# Patient Record
Sex: FEMALE | Race: WHITE | NOT HISPANIC OR LATINO | Employment: OTHER | URBAN - METROPOLITAN AREA
[De-identification: names, ages, dates, MRNs, and addresses within clinical notes are randomized per-mention and may not be internally consistent; named-entity substitution may affect disease eponyms.]

---

## 2017-10-19 ENCOUNTER — HOSPITAL ENCOUNTER (OUTPATIENT)
Dept: MAMMOGRAPHY | Facility: HOSPITAL | Age: 67
Discharge: HOME | End: 2017-10-19

## 2017-10-19 DIAGNOSIS — Z12.31 VISIT FOR SCREENING MAMMOGRAM: ICD-10-CM

## 2018-10-22 ENCOUNTER — HOSPITAL ENCOUNTER (OUTPATIENT)
Dept: MAMMOGRAPHY | Facility: CLINIC | Age: 68
Discharge: HOME | End: 2018-10-22

## 2018-10-22 DIAGNOSIS — Z12.31 VISIT FOR SCREENING MAMMOGRAM: ICD-10-CM

## 2019-11-07 ENCOUNTER — HOSPITAL ENCOUNTER (OUTPATIENT)
Dept: MAMMOGRAPHY | Facility: CLINIC | Age: 69
Discharge: HOME | End: 2019-11-07

## 2019-11-07 DIAGNOSIS — Z12.31 VISIT FOR SCREENING MAMMOGRAM: ICD-10-CM

## 2019-12-17 ENCOUNTER — AMBULATORY - HEALTHEAST (OUTPATIENT)
Dept: SCHEDULING | Facility: CLINIC | Age: 69
End: 2019-12-17

## 2019-12-17 DIAGNOSIS — I99.9 VASCULAR DISEASE: ICD-10-CM

## 2020-11-12 ENCOUNTER — RECORDS - HEALTHEAST (OUTPATIENT)
Dept: ADMINISTRATIVE | Facility: OTHER | Age: 70
End: 2020-11-12

## 2021-01-22 ENCOUNTER — HOSPITAL ENCOUNTER (OUTPATIENT)
Dept: MAMMOGRAPHY | Facility: CLINIC | Age: 71
Discharge: HOME | End: 2021-01-22

## 2021-01-22 DIAGNOSIS — Z12.31 VISIT FOR SCREENING MAMMOGRAM: ICD-10-CM

## 2021-05-27 ENCOUNTER — RECORDS - HEALTHEAST (OUTPATIENT)
Dept: ADMINISTRATIVE | Facility: CLINIC | Age: 71
End: 2021-05-27

## 2021-07-13 ENCOUNTER — RECORDS - HEALTHEAST (OUTPATIENT)
Dept: ADMINISTRATIVE | Facility: CLINIC | Age: 71
End: 2021-07-13

## 2021-07-21 ENCOUNTER — RECORDS - HEALTHEAST (OUTPATIENT)
Dept: ADMINISTRATIVE | Facility: CLINIC | Age: 71
End: 2021-07-21

## 2021-07-22 ENCOUNTER — RECORDS - HEALTHEAST (OUTPATIENT)
Dept: FAMILY MEDICINE | Facility: CLINIC | Age: 71
End: 2021-07-22

## 2021-07-22 DIAGNOSIS — Z12.31 OTHER SCREENING MAMMOGRAM: ICD-10-CM

## 2022-07-01 ENCOUNTER — HOSPITAL ENCOUNTER (EMERGENCY)
Facility: CLINIC | Age: 72
Discharge: HOME | End: 2022-07-01
Attending: EMERGENCY MEDICINE | Admitting: EMERGENCY MEDICINE
Payer: COMMERCIAL

## 2022-07-01 VITALS
HEART RATE: 83 BPM | WEIGHT: 220 LBS | RESPIRATION RATE: 19 BRPM | OXYGEN SATURATION: 98 % | TEMPERATURE: 96.8 F | BODY MASS INDEX: 38.97 KG/M2 | SYSTOLIC BLOOD PRESSURE: 170 MMHG | DIASTOLIC BLOOD PRESSURE: 87 MMHG

## 2022-07-01 DIAGNOSIS — K57.32 SIGMOID DIVERTICULITIS: ICD-10-CM

## 2022-07-01 PROBLEM — N39.3 URINARY, INCONTINENCE, STRESS FEMALE: Status: ACTIVE | Noted: 2017-03-03

## 2022-07-01 PROBLEM — N18.31 STAGE 3A CHRONIC KIDNEY DISEASE (H): Status: ACTIVE | Noted: 2020-12-24

## 2022-07-01 PROBLEM — M54.16 LUMBAR BACK PAIN WITH RADICULOPATHY AFFECTING LOWER EXTREMITY: Status: ACTIVE | Noted: 2020-12-09

## 2022-07-01 PROBLEM — D22.9 ATYPICAL NEVUS: Status: ACTIVE | Noted: 2021-04-02

## 2022-07-01 PROBLEM — N76.0 VULVOVAGINITIS: Status: ACTIVE | Noted: 2022-07-01

## 2022-07-01 PROBLEM — B02.9 HERPES ZOSTER WITHOUT COMPLICATION: Status: ACTIVE | Noted: 2020-07-29

## 2022-07-01 PROBLEM — D49.2 SKIN NEOPLASM: Status: ACTIVE | Noted: 2022-07-01

## 2022-07-01 PROBLEM — L65.9 HAIR LOSS: Status: ACTIVE | Noted: 2017-03-06

## 2022-07-01 PROBLEM — R32 URINARY INCONTINENCE: Status: ACTIVE | Noted: 2022-07-01

## 2022-07-01 LAB
ALBUMIN SERPL-MCNC: 3.9 G/DL (ref 3.5–5)
ALP SERPL-CCNC: 80 U/L (ref 45–120)
ALT SERPL W P-5'-P-CCNC: 25 U/L (ref 0–45)
ANION GAP SERPL CALCULATED.3IONS-SCNC: 10 MMOL/L (ref 5–18)
AST SERPL W P-5'-P-CCNC: 34 U/L (ref 0–40)
BASOPHILS # BLD AUTO: 0 10E3/UL (ref 0–0.2)
BASOPHILS NFR BLD AUTO: 0 %
BILIRUB DIRECT SERPL-MCNC: 0.2 MG/DL
BILIRUB SERPL-MCNC: 0.5 MG/DL (ref 0–1)
BUN SERPL-MCNC: 10 MG/DL (ref 8–28)
C REACTIVE PROTEIN LHE: 3 MG/DL (ref 0–?)
CALCIUM SERPL-MCNC: 9.7 MG/DL (ref 8.5–10.5)
CHLORIDE BLD-SCNC: 91 MMOL/L (ref 98–107)
CO2 SERPL-SCNC: 24 MMOL/L (ref 22–31)
CREAT SERPL-MCNC: 0.9 MG/DL (ref 0.6–1.1)
EOSINOPHIL # BLD AUTO: 0.1 10E3/UL (ref 0–0.7)
EOSINOPHIL NFR BLD AUTO: 1 %
ERYTHROCYTE [DISTWIDTH] IN BLOOD BY AUTOMATED COUNT: 13.4 % (ref 10–15)
GFR SERPL CREATININE-BSD FRML MDRD: 68 ML/MIN/1.73M2
GLUCOSE BLD-MCNC: 137 MG/DL (ref 70–125)
HCT VFR BLD AUTO: 34.1 % (ref 35–47)
HGB BLD-MCNC: 11.4 G/DL (ref 11.7–15.7)
IMM GRANULOCYTES # BLD: 0.1 10E3/UL
IMM GRANULOCYTES NFR BLD: 0 %
LIPASE SERPL-CCNC: 26 U/L (ref 0–52)
LYMPHOCYTES # BLD AUTO: 1.7 10E3/UL (ref 0.8–5.3)
LYMPHOCYTES NFR BLD AUTO: 9 %
MAGNESIUM SERPL-MCNC: 1.9 MG/DL (ref 1.8–2.6)
MCH RBC QN AUTO: 28.6 PG (ref 26.5–33)
MCHC RBC AUTO-ENTMCNC: 33.4 G/DL (ref 31.5–36.5)
MCV RBC AUTO: 86 FL (ref 78–100)
MONOCYTES # BLD AUTO: 0.7 10E3/UL (ref 0–1.3)
MONOCYTES NFR BLD AUTO: 4 %
NEUTROPHILS # BLD AUTO: 15.4 10E3/UL (ref 1.6–8.3)
NEUTROPHILS NFR BLD AUTO: 86 %
NRBC # BLD AUTO: 0 10E3/UL
NRBC BLD AUTO-RTO: 0 /100
PLATELET # BLD AUTO: 348 10E3/UL (ref 150–450)
POTASSIUM BLD-SCNC: 3.8 MMOL/L (ref 3.5–5)
PROT SERPL-MCNC: 7.9 G/DL (ref 6–8)
RBC # BLD AUTO: 3.99 10E6/UL (ref 3.8–5.2)
SODIUM SERPL-SCNC: 125 MMOL/L (ref 136–145)
WBC # BLD AUTO: 18 10E3/UL (ref 4–11)

## 2022-07-01 PROCEDURE — 83735 ASSAY OF MAGNESIUM: CPT | Performed by: EMERGENCY MEDICINE

## 2022-07-01 PROCEDURE — 250N000011 HC RX IP 250 OP 636: Performed by: EMERGENCY MEDICINE

## 2022-07-01 PROCEDURE — 82248 BILIRUBIN DIRECT: CPT | Performed by: EMERGENCY MEDICINE

## 2022-07-01 PROCEDURE — 96361 HYDRATE IV INFUSION ADD-ON: CPT

## 2022-07-01 PROCEDURE — 96374 THER/PROPH/DIAG INJ IV PUSH: CPT

## 2022-07-01 PROCEDURE — 83690 ASSAY OF LIPASE: CPT | Performed by: EMERGENCY MEDICINE

## 2022-07-01 PROCEDURE — 99284 EMERGENCY DEPT VISIT MOD MDM: CPT | Mod: 25

## 2022-07-01 PROCEDURE — 82310 ASSAY OF CALCIUM: CPT | Performed by: EMERGENCY MEDICINE

## 2022-07-01 PROCEDURE — 85004 AUTOMATED DIFF WBC COUNT: CPT | Performed by: EMERGENCY MEDICINE

## 2022-07-01 PROCEDURE — 86140 C-REACTIVE PROTEIN: CPT | Performed by: EMERGENCY MEDICINE

## 2022-07-01 PROCEDURE — 36415 COLL VENOUS BLD VENIPUNCTURE: CPT | Performed by: EMERGENCY MEDICINE

## 2022-07-01 PROCEDURE — 258N000003 HC RX IP 258 OP 636: Performed by: EMERGENCY MEDICINE

## 2022-07-01 RX ORDER — ONDANSETRON 4 MG/1
4 TABLET, ORALLY DISINTEGRATING ORAL EVERY 8 HOURS PRN
Qty: 20 TABLET | Refills: 0 | Status: SHIPPED | OUTPATIENT
Start: 2022-07-01 | End: 2024-04-16

## 2022-07-01 RX ORDER — DICYCLOMINE HCL 20 MG
20 TABLET ORAL 4 TIMES DAILY PRN
Qty: 20 TABLET | Refills: 0 | Status: SHIPPED | OUTPATIENT
Start: 2022-07-01 | End: 2024-04-16

## 2022-07-01 RX ORDER — ONDANSETRON 2 MG/ML
4 INJECTION INTRAMUSCULAR; INTRAVENOUS ONCE
Status: COMPLETED | OUTPATIENT
Start: 2022-07-01 | End: 2022-07-01

## 2022-07-01 RX ADMIN — ONDANSETRON 4 MG: 2 INJECTION INTRAMUSCULAR; INTRAVENOUS at 14:58

## 2022-07-01 RX ADMIN — SODIUM CHLORIDE, POTASSIUM CHLORIDE, SODIUM LACTATE AND CALCIUM CHLORIDE 1000 ML: 600; 310; 30; 20 INJECTION, SOLUTION INTRAVENOUS at 14:58

## 2022-07-01 ASSESSMENT — ENCOUNTER SYMPTOMS
APPETITE CHANGE: 1
ABDOMINAL PAIN: 1
DIARRHEA: 1
NAUSEA: 1

## 2022-07-01 NOTE — DISCHARGE INSTRUCTIONS
Take the antibiotic (Augmentin) for your diverticulitis. Stop your previously-prescribed antibiotics.    Use the Zofran for any further nausea.    As needed for pain control at home, take:  - over-the-counter ibuprofen 800mg by mouth every 8 hours (max dose 2400mg in 24 hours)  - over-the-counter acetaminophen 1g by mouth every 6 hours (max dose 4g in 24 hours)  - prescribed Bentyl for abdominal pain (this helps with intestinal spasming pain)    Follow up with your Primary Care provider in 2 days for a recheck.    Return to the Emergency Department for any persistent vomiting, severe worsening, or any other concerns.

## 2022-07-01 NOTE — ED TRIAGE NOTES
Pt dx with diverticulitis 2 days ago. Started abx same day. Last night had severe diarrhea and pain. Today pain isn't has bad. Worried about the medications taking.      Triage Assessment     Row Name 07/01/22 1229       Triage Assessment (Adult)    Airway WDL WDL       Respiratory WDL    Respiratory WDL WDL       Skin Circulation/Temperature WDL    Skin Circulation/Temperature WDL WDL       Cardiac WDL    Cardiac WDL WDL       Peripheral/Neurovascular WDL    Peripheral Neurovascular WDL WDL       Cognitive/Neuro/Behavioral WDL    Cognitive/Neuro/Behavioral WDL WDL

## 2022-08-29 DIAGNOSIS — I10 ESSENTIAL HYPERTENSION: ICD-10-CM

## 2022-08-29 RX ORDER — LISINOPRIL AND HYDROCHLOROTHIAZIDE 20; 25 MG/1; MG/1
2 TABLET ORAL DAILY
Qty: 180 TABLET | Refills: 4 | OUTPATIENT
Start: 2022-08-29

## 2022-12-09 ENCOUNTER — HOSPITAL ENCOUNTER (OUTPATIENT)
Dept: BONE DENSITY | Facility: HOSPITAL | Age: 72
Discharge: HOME | End: 2022-12-09
Attending: INTERNAL MEDICINE
Payer: COMMERCIAL

## 2022-12-09 ENCOUNTER — ANCILLARY PROCEDURE (OUTPATIENT)
Dept: MAMMOGRAPHY | Facility: HOSPITAL | Age: 72
End: 2022-12-09
Attending: INTERNAL MEDICINE
Payer: COMMERCIAL

## 2022-12-09 DIAGNOSIS — Z78.0 POST-MENOPAUSAL: ICD-10-CM

## 2022-12-09 DIAGNOSIS — Z12.31 VISIT FOR SCREENING MAMMOGRAM: ICD-10-CM

## 2022-12-09 PROCEDURE — 77080 DXA BONE DENSITY AXIAL: CPT

## 2022-12-09 PROCEDURE — 77067 SCR MAMMO BI INCL CAD: CPT

## 2023-10-17 ENCOUNTER — TRANSFERRED RECORDS (OUTPATIENT)
Dept: HEALTH INFORMATION MANAGEMENT | Facility: CLINIC | Age: 73
End: 2023-10-17

## 2023-11-20 ENCOUNTER — MEDICAL CORRESPONDENCE (OUTPATIENT)
Dept: HEALTH INFORMATION MANAGEMENT | Facility: CLINIC | Age: 73
End: 2023-11-20
Payer: COMMERCIAL

## 2023-12-12 ENCOUNTER — ANCILLARY PROCEDURE (OUTPATIENT)
Dept: MAMMOGRAPHY | Facility: HOSPITAL | Age: 73
End: 2023-12-12
Attending: INTERNAL MEDICINE
Payer: COMMERCIAL

## 2023-12-12 DIAGNOSIS — Z12.31 VISIT FOR SCREENING MAMMOGRAM: ICD-10-CM

## 2023-12-12 PROCEDURE — 77067 SCR MAMMO BI INCL CAD: CPT

## 2024-03-20 ENCOUNTER — TRANSCRIBE ORDERS (OUTPATIENT)
Dept: NEUROSURGERY | Facility: CLINIC | Age: 74
End: 2024-03-20
Payer: COMMERCIAL

## 2024-03-20 DIAGNOSIS — G95.0 SYRINX OF SPINAL CORD (H): Primary | ICD-10-CM

## 2024-04-01 ENCOUNTER — TELEPHONE (OUTPATIENT)
Dept: NEUROSURGERY | Facility: CLINIC | Age: 74
End: 2024-04-01
Payer: COMMERCIAL

## 2024-04-01 NOTE — TELEPHONE ENCOUNTER
Action    Action Taken Some notes received from [place], sent fax to see if patient has any other recent notes from 11/20/2023.      SPINE PATIENTS - NEW PROTOCOL PREVISIT    RECORDS RECEIVED FROM: referred by , Barney Hanley PA-C to see Dr. Victoria   REASON FOR VISIT: Syrinx of spinal cord (H)   PROVIDER: Julien   DATE OF APPT: 04/16/2024   NOTES (FOR ALL VISITS) STATUS DETAILS   OFFICE NOTE from referring provider Received Referral received and scanned into chart   OFFICE NOTE from other specialist N/A    DISCHARGE SUMMARY from hospital N/A    DISCHARGE REPORT from ER N/A    OPERATIVE REPORT N/A    EMG REPORT N/A    MEDICATION LIST N/A    IMAGING  (FOR ALL VISITS)     MRI (HEAD, NECK, SPINE) Received MRI Thoracic 11/17/2023, in chart  MRI Lumbar 10/17/2023, in chart   XRAY (SPINE) *NEUROSURGERY* N/A    CT (HEAD, NECK, SPINE) N/A

## 2024-04-01 NOTE — TELEPHONE ENCOUNTER
----- Message from Clari Troy RN sent at 4/1/2024  3:05 PM CDT -----  Regarding: RE: Upcoming appt  Okay to keep as scheduled. No updated imaging needed  ----- Message -----  From: Ovidio Hitchcock  Sent: 4/1/2024   2:18 PM CDT  To: Edgar Victoria MD; #  Subject: RE: Upcoming appt                                Patient referred to us for Syrinx of spinal cord (H). Last imaging completed MRI Thoracic 11/17/2023, in chart and MRI Lumbar 10/17/2023, in chart. Does patient need any recent imaging prior to visit? Or ok to keep appt as scheduled?

## 2024-04-16 ENCOUNTER — OFFICE VISIT (OUTPATIENT)
Dept: NEUROSURGERY | Facility: CLINIC | Age: 74
End: 2024-04-16
Attending: PHYSICIAN ASSISTANT
Payer: COMMERCIAL

## 2024-04-16 ENCOUNTER — PRE VISIT (OUTPATIENT)
Dept: NEUROSURGERY | Facility: CLINIC | Age: 74
End: 2024-04-16
Payer: COMMERCIAL

## 2024-04-16 VITALS
HEIGHT: 63 IN | SYSTOLIC BLOOD PRESSURE: 134 MMHG | OXYGEN SATURATION: 97 % | DIASTOLIC BLOOD PRESSURE: 84 MMHG | WEIGHT: 230 LBS | HEART RATE: 75 BPM | BODY MASS INDEX: 40.75 KG/M2

## 2024-04-16 DIAGNOSIS — G95.0 SYRINX OF SPINAL CORD (H): ICD-10-CM

## 2024-04-16 DIAGNOSIS — M54.16 SPINAL STENOSIS OF LUMBAR REGION WITH RADICULOPATHY: ICD-10-CM

## 2024-04-16 DIAGNOSIS — M43.10 DEGENERATIVE SPONDYLOLISTHESIS: Primary | ICD-10-CM

## 2024-04-16 DIAGNOSIS — M48.061 SPINAL STENOSIS OF LUMBAR REGION WITH RADICULOPATHY: ICD-10-CM

## 2024-04-16 PROCEDURE — G0463 HOSPITAL OUTPT CLINIC VISIT: HCPCS | Performed by: NEUROLOGICAL SURGERY

## 2024-04-16 PROCEDURE — G2211 COMPLEX E/M VISIT ADD ON: HCPCS | Performed by: NEUROLOGICAL SURGERY

## 2024-04-16 PROCEDURE — 99204 OFFICE O/P NEW MOD 45 MIN: CPT | Performed by: NEUROLOGICAL SURGERY

## 2024-04-16 RX ORDER — CARTEOLOL HYDROCHLORIDE 10 MG/ML
1 SOLUTION OPHTHALMIC 2 TIMES DAILY
COMMUNITY

## 2024-04-16 RX ORDER — BIMATOPROST 0.1 MG/ML
SOLUTION/ DROPS OPHTHALMIC
COMMUNITY
Start: 2023-04-14

## 2024-04-16 RX ORDER — AMLODIPINE BESYLATE 5 MG/1
TABLET ORAL
COMMUNITY

## 2024-04-16 RX ORDER — MESALAMINE 1.2 G/1
TABLET, DELAYED RELEASE ORAL
COMMUNITY
Start: 2024-04-15

## 2024-04-16 RX ORDER — ROSUVASTATIN CALCIUM 5 MG/1
5 TABLET, COATED ORAL AT BEDTIME
COMMUNITY

## 2024-04-16 ASSESSMENT — PAIN SCALES - GENERAL: PAINLEVEL: MODERATE PAIN (4)

## 2024-04-16 NOTE — NURSING NOTE
"Danni Cruz is a 73 year old female who presents for:  Chief Complaint   Patient presents with    Consult        Vitals:    Vitals:    04/16/24 1247   BP: 134/84   Pulse: 75   SpO2: 97%   Weight: 230 lb (104.3 kg)   Height: 5' 3\" (1.6 m)       BMI:  Estimated body mass index is 40.74 kg/m  as calculated from the following:    Height as of this encounter: 5' 3\" (1.6 m).    Weight as of this encounter: 230 lb (104.3 kg).    Pain Score:  Moderate Pain (4)        Amendo Phorn      "

## 2024-04-16 NOTE — LETTER
4/16/2024         RE: Danni Cruz  [address] [facility] [id number]        Dear Colleague,    Thank you for referring your patient, Danni Cruz, to the [facility] [place]. Please see a copy of my visit note below.    It was a pleasure to see Danni Cruz today in Neurosurgery Clinic. She is a 73 year old female who is here for her thoracic syrinx.    She has significant symptoms from lumbar stenosis and has been seen at [facility].  She has done physical therapy and epidural injections with some temporary relief.    No past medical history on file.  No past surgical history on file.     Allergies   Allergen Reactions     Cefuroxime Diarrhea     Metronidazole Diarrhea     Procaine Other (See Comments)     Other reaction(s): Tachycardia    Tolerates carbocaine       Current Outpatient Medications:      co-enzyme Q-10 30 mg capsule, [CO-ENZYME Q-10 30 MG CAPSULE] Take 30 mg by mouth 3 (three) times a day., Disp: , Rfl:      levothyroxine (SYNTHROID, LEVOTHROID) 50 MCG tablet, [LEVOTHYROXINE (SYNTHROID, LEVOTHROID) 50 MCG TABLET] Take 1 tablet (50 mcg total) by mouth Daily at 6:00 am., Disp: 90 tablet, Rfl: 4     lisinopril-hydrochlorothiazide (PRINZIDE,ZESTORETIC) 20-25 mg per tablet, [LISINOPRIL-HYDROCHLOROTHIAZIDE (PRINZIDE,ZESTORETIC) 20-25 MG PER TABLET] Take 2 tablets by mouth daily., Disp: 180 tablet, Rfl: 4     LUMIGAN 0.01 % SOLN ophthalmic solution, , Disp: , Rfl:      mesalamine (LIALDA) 1.2 g DR tablet, , Disp: , Rfl:      nortriptyline (PAMELOR) 25 MG capsule, [NORTRIPTYLINE (PAMELOR) 25 MG CAPSULE] Take 1 capsule (25 mg total) by mouth bedtime., Disp: 90 capsule, Rfl: 4     OMEGA-3/DHA/EPA/FISH OIL (FISH OIL-OMEGA-3 FATTY ACIDS) 300-1,000 mg capsule, [OMEGA-3/DHA/EPA/FISH OIL (FISH OIL-OMEGA-3 FATTY ACIDS) 300-1,000 MG CAPSULE] Take 2 g by mouth daily., Disp: , Rfl:      ALPRAZolam (XANAX) 0.25 MG tablet, [ALPRAZOLAM (XANAX) 0.25 MG TABLET]  Take 1 tab 30 min prior to flight, repeat x 1 as needed, Disp: 6 tablet, Rfl: 1     amLODIPine (NORVASC) 5 MG tablet, , Disp: , Rfl:      carteolol (OCUPRESS) 1 % ophthalmic solution, Place 1 drop into both eyes 2 times daily, Disp: , Rfl:      rosuvastatin (CRESTOR) 5 MG tablet, Take 5 mg by mouth at bedtime, Disp: , Rfl:   Social History     Socioeconomic History     Marital status:      Spouse name: None     Number of children: None     Years of education: None     Highest education level: None   Tobacco Use     Smoking status: Never     Smokeless tobacco: Never     Social Determinants of Health     Financial Resource Strain: Low Risk  (8/28/2023)    [facility], [facility]    Financial Resource Strain      Difficulty of Paying Living Expenses: 3   Food Insecurity: No Food Insecurity (8/28/2023)    Received from [facility], [facility]    Food Insecurity      Worried About Running Out of Food in the Last Year: 1   Transportation Needs: No Transportation Needs (8/28/2023)    Received from [facility], [employer] [facility]    Transportation Needs      Lack of Transportation (Medical): 1    Received from [facility], [facility]    Social Connections   Housing Stability: Low Risk  (8/28/2023)    Received from [facility], [employer] [facility] [facility]    Housing Stability      Unable to Pay for Housing in the Last Year: 1      Problem (# of Occurrences) Relation (Name,Age of Onset)    Breast Cancer (1) Cousin: 40s - paternal    Kidney Cancer (1) Father    Lung Cancer (1) Mother             ROS: 10 point ROS neg other than the symptoms noted above in the HPI.    Vitals:    04/16/24 1247   BP:  "134/84   Pulse: 75   SpO2: 97%   Weight: 104.3 kg (230 lb)   Height: 1.6 m (5' 3\")     Body mass index is 40.74 kg/m .  Moderate Pain (4)    Awake alert and oriented  Bilateral upper and lower extremity strength is 5 out of 5 in all muscle groups reflexes symmetric and normal.      Imaging: Review of thoracic and lumbar MRIs shows severe lumbar stenosis particularly at L4-5 which I think is concordant with her current symptomatology.  There does appear to be what is likely a persistent central canal in the thoracic spine that tends to skip levels.  The imaging was reviewed with patient shown to the patient in clinic today.    Assessment: Lumbar stenosis and spondylolisthesis with radiculopathy.  Thoracic persistent central canal.    Plan: I do not think that the imaging findings in the thoracic spine are concerning and should not keep her from having surgery on her lumbar spine.  I do not think that further follow-up is necessary at this time.         Again, thank you for allowing me to participate in the care of your patient.        Sincerely,        Edgar Victoria MD  "

## 2024-04-16 NOTE — PROGRESS NOTES
It was a pleasure to see Danni Cruz today in Neurosurgery Clinic. She is a 73 year old female who is here for her thoracic syrinx.    She has significant symptoms from lumbar stenosis and has been seen at [facility].  She has done physical therapy and epidural injections with some temporary relief.    No past medical history on file.  No past surgical history on file.     Allergies   Allergen Reactions    Cefuroxime Diarrhea    Metronidazole Diarrhea    Procaine Other (See Comments)     Other reaction(s): Tachycardia    Tolerates carbocaine       Current Outpatient Medications:     co-enzyme Q-10 30 mg capsule, [CO-ENZYME Q-10 30 MG CAPSULE] Take 30 mg by mouth 3 (three) times a day., Disp: , Rfl:     levothyroxine (SYNTHROID, LEVOTHROID) 50 MCG tablet, [LEVOTHYROXINE (SYNTHROID, LEVOTHROID) 50 MCG TABLET] Take 1 tablet (50 mcg total) by mouth Daily at 6:00 am., Disp: 90 tablet, Rfl: 4    lisinopril-hydrochlorothiazide (PRINZIDE,ZESTORETIC) 20-25 mg per tablet, [LISINOPRIL-HYDROCHLOROTHIAZIDE (PRINZIDE,ZESTORETIC) 20-25 MG PER TABLET] Take 2 tablets by mouth daily., Disp: 180 tablet, Rfl: 4    LUMIGAN 0.01 % SOLN ophthalmic solution, , Disp: , Rfl:     mesalamine (LIALDA) 1.2 g DR tablet, , Disp: , Rfl:     nortriptyline (PAMELOR) 25 MG capsule, [NORTRIPTYLINE (PAMELOR) 25 MG CAPSULE] Take 1 capsule (25 mg total) by mouth bedtime., Disp: 90 capsule, Rfl: 4    OMEGA-3/DHA/EPA/FISH OIL (FISH OIL-OMEGA-3 FATTY ACIDS) 300-1,000 mg capsule, [OMEGA-3/DHA/EPA/FISH OIL (FISH OIL-OMEGA-3 FATTY ACIDS) 300-1,000 MG CAPSULE] Take 2 g by mouth daily., Disp: , Rfl:     ALPRAZolam (XANAX) 0.25 MG tablet, [ALPRAZOLAM (XANAX) 0.25 MG TABLET] Take 1 tab 30 min prior to flight, repeat x 1 as needed, Disp: 6 tablet, Rfl: 1    amLODIPine (NORVASC) 5 MG tablet, , Disp: , Rfl:     carteolol (OCUPRESS) 1 % ophthalmic solution, Place 1 drop into both eyes 2 times daily, Disp: , Rfl:     rosuvastatin (CRESTOR) 5 MG tablet,  "Take 5 mg by mouth at bedtime, Disp: , Rfl:   Social History     Socioeconomic History    Marital status:      Spouse name: None    Number of children: None    Years of education: None    Highest education level: None   Tobacco Use    Smoking status: Never    Smokeless tobacco: Never     Social Determinants of Health     Financial Resource Strain: Low Risk  (8/28/2023)    [facility], [facility]    Financial Resource Strain     Difficulty of Paying Living Expenses: 3   Food Insecurity: No Food Insecurity (8/28/2023)    Received from [facility], [facility]    Food Insecurity     Worried About Running Out of Food in the Last Year: 1   Transportation Needs: No Transportation Needs (8/28/2023)    Received from [facility], [employer] [facility] [employer] [facility]    Transportation Needs     Lack of Transportation (Medical): 1    Received from [facility], [facility]    Social Connections   Housing Stability: Low Risk  (8/28/2023)    Received [facility], [facility]    Housing Stability     Unable to Pay for Housing in the Last Year: 1      Problem (# of Occurrences) Relation (Name,Age of Onset)    Breast Cancer (1) Cousin: 40s - paternal    Kidney Cancer (1) Father    Lung Cancer (1) Mother             ROS: 10 point ROS neg other than the symptoms noted above in the HPI.    Vitals:    04/16/24 1247   BP: 134/84   Pulse: 75   SpO2: 97%   Weight: 104.3 kg (230 lb)   Height: 1.6 m (5' 3\")     Body mass index is 40.74 kg/m .  Moderate Pain (4)    Awake alert and oriented  Bilateral upper and lower extremity strength is 5 out of 5 in all muscle groups reflexes symmetric and normal.      Imaging: " Review of thoracic and lumbar MRIs shows severe lumbar stenosis particularly at L4-5 which I think is concordant with her current symptomatology.  There does appear to be what is likely a persistent central canal in the thoracic spine that tends to skip levels.  The imaging was reviewed with patient shown to the patient in clinic today.    Assessment: Lumbar stenosis and spondylolisthesis with radiculopathy.  Thoracic persistent central canal.    Plan: I do not think that the imaging findings in the thoracic spine are concerning and should not keep her from having surgery on her lumbar spine.  I do not think that further follow-up is necessary at this time.

## 2024-05-23 ENCOUNTER — MEDICAL CORRESPONDENCE (OUTPATIENT)
Dept: HEALTH INFORMATION MANAGEMENT | Facility: CLINIC | Age: 74
End: 2024-05-23
Payer: COMMERCIAL

## 2025-05-12 ENCOUNTER — ANCILLARY PROCEDURE (OUTPATIENT)
Dept: MAMMOGRAPHY | Facility: CLINIC | Age: 75
End: 2025-05-12
Attending: INTERNAL MEDICINE
Payer: COMMERCIAL

## 2025-05-12 DIAGNOSIS — Z12.31 VISIT FOR SCREENING MAMMOGRAM: ICD-10-CM

## 2025-05-12 PROCEDURE — 77063 BREAST TOMOSYNTHESIS BI: CPT

## 2025-06-10 ENCOUNTER — TRANSFERRED RECORDS (OUTPATIENT)
Dept: HEALTH INFORMATION MANAGEMENT | Facility: CLINIC | Age: 75
End: 2025-06-10
Payer: COMMERCIAL

## 2025-06-11 ENCOUNTER — MEDICAL CORRESPONDENCE (OUTPATIENT)
Dept: HEALTH INFORMATION MANAGEMENT | Facility: CLINIC | Age: 75
End: 2025-06-11
Payer: COMMERCIAL

## 2025-06-12 ENCOUNTER — TRANSCRIBE ORDERS (OUTPATIENT)
Dept: OTHER | Age: 75
End: 2025-06-12

## 2025-06-12 DIAGNOSIS — M48.00 SPINAL STENOSIS, UNSPECIFIED SPINAL REGION: Primary | ICD-10-CM

## 2025-06-16 ENCOUNTER — PATIENT OUTREACH (OUTPATIENT)
Dept: CARE COORDINATION | Facility: CLINIC | Age: 75
End: 2025-06-16
Payer: COMMERCIAL

## 2025-06-18 ENCOUNTER — PATIENT OUTREACH (OUTPATIENT)
Dept: CARE COORDINATION | Facility: CLINIC | Age: 75
End: 2025-06-18
Payer: COMMERCIAL

## 2025-07-08 NOTE — TELEPHONE ENCOUNTER
Records Requested   July 8, 2025 3:01 PM   [id number]   Facility  [facility]   Outcome 3:03 pm  Sent request for imaging to be pushed to PACS. -KOBE     DIAGNOSIS: Spinal stenosis, unspecified spinal region   APPOINTMENT DATE: 8/19/25    NOTES STATUS DETAILS   OFFICE NOTE from referring provider Received 6/10/25 (Transferred Recs) Lucien Williamson MD @[facility]     OFFICE NOTE from other specialist Care Everywhere 11/27/24, 9/26/24 Lluvia Corona MD  @[facility]    4/16/24 Edgar Victoria MD @[facility] Neuro[facility]urg    7/5/21 Chelita Mcconnell MD  @[facility]     DEXA SCAN [facility]  12/9/22 DX Hip/Pelvis/Spine     MRI In process [facility]  11/17/23 MR Thoracic Spine    [facility]   10/17/23 MR Lumbar Spine    [facility]*  7/26/21 MR Lumbar Spine     XRAYS (IMAGES & REPORTS) In process [facility]*  12/9/20 XR Lumbar Spine

## 2025-08-12 DIAGNOSIS — M48.00 SPINAL STENOSIS, UNSPECIFIED SPINAL REGION: Primary | ICD-10-CM

## 2025-08-18 ENCOUNTER — TELEPHONE (OUTPATIENT)
Dept: ORTHOPEDICS | Facility: CLINIC | Age: 75
End: 2025-08-18
Payer: COMMERCIAL

## 2025-08-19 ENCOUNTER — PRE VISIT (OUTPATIENT)
Dept: ORTHOPEDICS | Facility: CLINIC | Age: 75
End: 2025-08-19